# Patient Record
Sex: FEMALE | Race: WHITE | NOT HISPANIC OR LATINO | Employment: UNEMPLOYED | URBAN - METROPOLITAN AREA
[De-identification: names, ages, dates, MRNs, and addresses within clinical notes are randomized per-mention and may not be internally consistent; named-entity substitution may affect disease eponyms.]

---

## 2017-02-10 ENCOUNTER — TELEPHONE (OUTPATIENT)
Dept: FAMILY MEDICINE | Facility: CLINIC | Age: 52
End: 2017-02-10

## 2017-02-10 NOTE — TELEPHONE ENCOUNTER
"2/10/2017      Patient declined to schedule and might goes else where    Opal \"Kim\" Chance  Central Scheduler    "

## 2017-05-21 ENCOUNTER — HOSPITAL ENCOUNTER (EMERGENCY)
Facility: CLINIC | Age: 52
Discharge: HOME | End: 2017-05-21
Attending: EMERGENCY MEDICINE | Admitting: EMERGENCY MEDICINE
Payer: OTHER MISCELLANEOUS

## 2017-05-21 ENCOUNTER — APPOINTMENT (OUTPATIENT)
Dept: GENERAL RADIOLOGY | Facility: CLINIC | Age: 52
End: 2017-05-21
Attending: INTERNAL MEDICINE
Payer: OTHER MISCELLANEOUS

## 2017-05-21 VITALS
HEART RATE: 65 BPM | OXYGEN SATURATION: 99 % | TEMPERATURE: 97.9 F | RESPIRATION RATE: 14 BRPM | SYSTOLIC BLOOD PRESSURE: 113 MMHG | DIASTOLIC BLOOD PRESSURE: 73 MMHG

## 2017-05-21 DIAGNOSIS — S82.832A CLOSED FRACTURE OF DISTAL END OF LEFT FIBULA, UNSPECIFIED FRACTURE MORPHOLOGY, INITIAL ENCOUNTER: ICD-10-CM

## 2017-05-21 PROCEDURE — 29515 APPLICATION SHORT LEG SPLINT: CPT | Mod: LT

## 2017-05-21 PROCEDURE — 73610 X-RAY EXAM OF ANKLE: CPT | Mod: LT

## 2017-05-21 PROCEDURE — 25000132 ZZH RX MED GY IP 250 OP 250 PS 637: Performed by: EMERGENCY MEDICINE

## 2017-05-21 PROCEDURE — 99284 EMERGENCY DEPT VISIT MOD MDM: CPT | Mod: 25

## 2017-05-21 RX ORDER — IBUPROFEN 600 MG/1
600 TABLET, FILM COATED ORAL ONCE
Status: DISCONTINUED | OUTPATIENT
Start: 2017-05-21 | End: 2017-05-21 | Stop reason: HOSPADM

## 2017-05-21 RX ORDER — IBUPROFEN 800 MG/1
800 TABLET, FILM COATED ORAL ONCE
Status: COMPLETED | OUTPATIENT
Start: 2017-05-21 | End: 2017-05-21

## 2017-05-21 RX ORDER — OXYCODONE HYDROCHLORIDE 5 MG/1
5 TABLET ORAL EVERY 6 HOURS PRN
Qty: 10 TABLET | Refills: 0 | Status: SHIPPED | OUTPATIENT
Start: 2017-05-21 | End: 2020-08-11

## 2017-05-21 RX ORDER — IBUPROFEN 600 MG/1
600 TABLET, FILM COATED ORAL EVERY 6 HOURS PRN
Qty: 60 TABLET | Refills: 0 | Status: SHIPPED | OUTPATIENT
Start: 2017-05-21 | End: 2020-08-11

## 2017-05-21 RX ADMIN — IBUPROFEN 800 MG: 800 TABLET, FILM COATED ORAL at 16:25

## 2017-05-21 NOTE — ED PROVIDER NOTES
History     Chief Complaint:  Left Ankle Pain    HPI   Chani Marie is a 51 year old female who presents to the emergency department today for evaluation of left ankle pain. The patient reports that she tripped over a rug while at work at [employer] at 1300 and hurt her left ankle. The pain is around the outer aspect of the ankle. She denies pain elsewhere. She has not been able to walk on her left foot, and last ate/drank at 0700.    Allergies:  No Known Drug Allergies    Medications:    [place] thyroid    Past Medical History:    Unspecified hypothyroidsim    Past Surgical History:    Lasik surgery    Family History:    Cancer - father  Lipids - mother    Social History:  The patient was accompanied to the ED by  and daughter.  Smoking Status: Never smoker  Smokeless Tobacco: Never used  Alcohol Use: Yes  Marital Status:   [2]     Review of Systems   Musculoskeletal:        Left ankle pain   All other systems reviewed and are negative.    Physical Exam   Vitals:  Patient Vitals for the past 24 hrs:   BP Temp Temp src Pulse Resp SpO2   05/21/17 1442 113/73 97.9  F (36.6  C) Temporal 65 14 99 %     Physical Exam  General: Well-nourished, appears to be in pain  Eyes: PERRL, conjunctivae pink no scleral icterus or conjunctival injection  ENT:  Moist mucus membranes  Respiratory:  No respiratory distress  CV: Normal rate. Normal DP pulse and cap refill left foot  Skin: Warm, dry.  No rashes or petechiae  Musculoskeletal: Edema over lateral malleolus with ecchymosis and tenderness over the area.  No tenderness over mid foot, proximal fibula or remeainder of ankle/leg/foot.  Normal ROM at knee.  Neuro: Alert and oriented to person/place/time.  Normal distal sensation to LT.  Psychiatric: Normal affect      Emergency Department Course     Imaging:  Radiology findings were communicated with the patient who voiced understanding of the findings.    Ankle Xray, G/E 3 views, left  There is a  nondisplaced oblique fracture through the  distal fibula at the level of the ankle joint with overlying soft  tissue swelling. No other abnormality is seen.  Reading per radiology    Procedures:  1548   Splint Placement    PLACEMENT: Custom Orthoglass stirrup and posterior leg splint was applied to the left lower extremity and after placement I checked and adjusted the fit to ensure proper positioning. The patient was more comfortable with the splint in place. Sensation and circulation are intact after splint placement.     Interventions:  1516 ibuprofen 600 mg oral     Emergency Department Course:  Nursing notes and vitals reviewed.  I performed an exam of the patient as documented above.   The patient was sent for a ankle xray, G/E 3 views, left, while in the emergency department, results above.   At 1540 the patient was rechecked and was updated on the results of her imaging studies.   I discussed the treatment plan with the patient. She expressed understanding of this plan and consented to discharge. She will be discharged home with instructions for care and follow up. In addition, the patient will return to the emergency department if their symptoms persist, worsen, if new symptoms arise or if there is any concern.  All questions were answered.  I personally reviewed the imaging results with the Patient and answered all related questions prior to discharge.    Impression & Plan      Medical Decision Making:  Chani Marie is a 51 year old woman who twisted her ankle and fell. She has been non-ambulatory and has a distal fibular fracture.There is no evidence of a proximal fibular fracture, mid foot fracture.  On examination, she is neurovascularly intact. The fracture is non displaced. We put her in a stirrup and a post-mold splint with crutches, and she will be non weight bearing until she is seen by orthopedics. She was given splint precautions. She is to rest, ice, and elevate the leg and return if any  worsening occurs.    Diagnosis:    ICD-10-CM    1. Closed fracture of distal end of left fibula, unspecified fracture morphology, initial encounter S82.581P      Disposition:   Home    Discharge Medications:  New Prescriptions    IBUPROFEN (ADVIL/MOTRIN) 600 MG TABLET    Take 1 tablet (600 mg) by mouth every 6 hours as needed for moderate pain    OXYCODONE (ROXICODONE) 5 MG IR TABLET    Take 1 tablet (5 mg) by mouth every 6 hours as needed for pain       Scribe Disclosure:  Azalea CARLSON, am serving as a scribe at 3:05 PM on 5/21/2017 to document services personally performed by Mary Ellen Hale MD, based on my observations and the provider's statements to me.    5/21/2017   [facility] EMERGENCY DEPARTMENT       Mary Ellen Hale MD  05/21/17 3233

## 2017-05-21 NOTE — ED NOTES
Patient presents to the ED with left ankle pain. States tripped on rug and fell, twisting ankle. Unable to bear weight.

## 2017-05-21 NOTE — DISCHARGE INSTRUCTIONS
*Wear splint as directed.  Use crutches.  Rest, ice, elevation.  *Take medications as prescribed.  Ibuprofen and/or tylenol for pain and oxycodone for severe pain not relieved by ibuprofen. Continue your current medications.  *Follow-up with orthopedics in 3-5 days.  *Return if you become worse in any way.        Ankle Fracture, Distal Fibula  You have a fracture, or broken bone, of the end of the fibula bone. The fibula is one of two bones that support the ankle joint.    Home care    You will be given a splint, cast, or special boot to prevent movement at the injury site. Do not put weight on a splint. It will break. Follow your healthcare provider s advice about when to begin bearing weight on a cast or boot.    Keep your leg elevated when sitting or lying down. When sleeping, place a pillow under the injured leg. When sitting, support the injured leg so it is level with your waist. This is very important during the first 48 hours.    Keep the cast or splint completely dry at all times. When bathing, protect the cast or splint with 2 large plastic bags. Place 1 bag outside of the other. Tape each bag with duct tape at the top end. Water can still leak in even when the foot is covered. So it's best to keep the cast, splint, or boot away from water. If a fiberglass cast or splint gets wet, dry it with a hair dryer on a cool setting.    Place an ice pack over the injured area for no more than 15 to 20 minutes. Do this every 3 to 6 hours for the first 24 to 48 hours. Continue this 3 to 4 times a day as needed. To make an ice pack, put ice cubes in a plastic bag that seals at the top. Wrap the bag in a clean, thin towel or cloth. Never put ice or an ice pack directly on the skin. The ice pack can be put right on the cast or splint. As the ice melts, be careful that the cast or splint doesn t get wet.    You may use over-the-counter pain medicine to control pain, unless another pain medicine was prescribed. If you have  chronic liver or kidney disease or ever had a stomach ulcer or GI bleeding, talk with your provider before using these medicines.  Follow-up care  Follow up with your healthcare provider in 1 week, or as advised. This is to be sure the bone is healing properly. If you were given a splint, it may be changed to a cast after the swelling goes down.  If X-rays were taken, you will be told of any new findings that may affect your care.  When to seek medical advice  Call your healthcare provider right away if any of these occur:    The plaster cast or splint becomes wet or soft    The fiberglass cast or splint stays wet for more than 24 hours    There is increased tightness or pain under the cast or splint    Your toes become swollen, cold, blue, numb, or tingly    The cast becomes loose    The cast has a bad smell    Sore areas develop under the cast    The cast develops cracks or breaks     5108-6114 [employer]. [address] [place] [address]. All rights reserved. This information is not intended as a substitute for professional medical care. Always follow your healthcare professional's instructions.          Discharge Instructions  Splint Care    You had a splint put on today to help protect your injury and help it heal.  Splints are used to treat things like strains, sprains, cuts and fractures (broken bones).    Be sure your splint is not too tight!  If you splint is too tight, it may cause loss of blood supply.  Signs of your splint being too tight include:  your arm or leg hurting a lot more; your fingers or toes getting numb, cold, pale or blue; or your child is crying, fussing or seeming restless.    Return to the Emergency Department right away if:    You have increased pain or pressure around the injury.    You have numbness, tingling, or cool, pale, or blue toes or fingers past the injury.    Your child is more fussy than normal, crying a lot, or restless.    Your splint becomes soft,  breaks, or is wet.    Your splint begins to smell bad.    Your splint is cutting into your skin.    Home care:    Keep the injured area above the level of your heart while laying or sitting down.  This will help decrease the swelling and the pain.    Keep the splint dry.    Do not put objects down or inside the splint.    If there is an elastic bandage (Ace  wrap) holding the splint on this may be loosened slightly to relieve pressure or pain.  If pain continues return to the Emergency Department right away.    Do not remove your splint by yourself unless told to by your doctor.    Follow-up:  Sometimes the splint put on in the Emergency Department needs to be changed once the swelling has gone down and a more permanent cast needs to be placed.  This is usually done by a bone specialist doctor (Orthopedist).  Follow the instructions given to you by your doctor today.    X-rays:  X-rays done today were read by your doctor but will also be read by a radiologist.  We will contact you if the radiologist sees anything different on the x-ray.  Your regular doctor may also want to review your x-rays on follow-up.    You could have a fracture (break), even if we told you your x-rays were normal. X-rays are not always certain, and some fractures are hard to see and may not show up right away.  Also, your x-ray may look like you have a fracture, even though you do not.  It is important to follow-up with your regular doctor.     If you were given a prescription for medicine here today, be sure to read all of the information (including the package insert) that comes with your prescription.  This will include important information about the medicine, its side effects, and any warnings that you need to know about.  The pharmacist who fills the prescription can provide more information and answer questions you may have about the medicine.  If you have questions or concerns that the pharmacist cannot address, please call or return  to the Emergency Department.   Opioid Medication Information    Pain medications are among the most commonly prescribed medicines, so we are including this information for all our patients. If you did not receive pain medication or get a prescription for pain medicine, you can ignore it.     You may have been given a prescription for an opioid (narcotic) pain medicine and/or have received a pain medicine while here in the Emergency Department. These medicines can make you drowsy or impaired. You must not drive, operate dangerous equipment, or engage in any other dangerous activities while taking these medications. If you drive while taking these medications, you could be arrested for DUI, or driving under the influence. Do not drink any alcohol while you are taking these medications.     Opioid pain medications can cause addiction. If you have a history of chemical dependency of any type, you are at a higher risk of becoming addicted to pain medications.  Only take these prescribed medications to treat your pain when all other options have been tried. Take it for as short a time and as few doses as possible. Store your pain pills in a secure place, as they are frequently stolen and provide a dangerous opportunity for children or visitors in your house to start abusing these powerful medications. We will not replace any lost or stolen medicine.  As soon as your pain is better, you should flush all your remaining medication.     Many prescription pain medications contain Tylenol  (acetaminophen), including Vicodin , Tylenol #3 , Norco , Lortab , and Percocet .  You should not take any extra pills of Tylenol  if you are using these prescription medications or you can get very sick.  Do not ever take more than 3000 mg of acetaminophen in any 24 hour period.    All opioids tend to cause constipation. Drink plenty of water and eat foods that have a lot of fiber, such as fruits, vegetables, prune juice, apple juice and  high fiber cereal.  Take a laxative if you don t move your bowels at least every other day. Miralax , Milk of Magnesia, Colace , or Senna  can be used to keep you regular.      Remember that you can always come back to the Emergency Department if you are not able to see your regular doctor in the amount of time listed above, if you get any new symptoms, or if there is anything that worries you.

## 2017-05-21 NOTE — ED AVS SNAPSHOT
[facility] Emergency Department    [address]    [facility] [address]    Phone:  [phone]    Fax:  [phone]                                       Chani Marie   MRN: [id number]    Department:  [facility] Emergency Department   Date of Visit:  5/21/2017           After Visit Summary Signature Page     I have received my discharge instructions, and my questions have been answered. I have discussed any challenges I see with this plan with the nurse or doctor.    ..........................................................................................................................................  Patient/Patient Representative Signature      ..........................................................................................................................................  Patient Representative Print Name and Relationship to Patient    ..................................................               ................................................  Date                                            Time    ..........................................................................................................................................  Reviewed by Signature/Title    ...................................................              ..............................................  Date                                                            Time

## 2017-05-21 NOTE — ED AVS SNAPSHOT
[facility] Emergency Department    [address]    [facility] [address]    Phone:  [phone]    Fax:  [phone]                                       Chani Marie   MRN: [id number]    Department:  [facility] Emergency Department   Date of Visit:  5/21/2017           Patient Information     Date Of Birth          1965        Your diagnoses for this visit were:     Closed fracture of distal end of left fibula, unspecified fracture morphology, initial encounter        You were seen by Mary Ellen Hale MD.      Follow-up Information     Follow up with [facility] [place]. Schedule an appointment as soon as possible for a visit in 3 days.    Contact information:    [address]  [address]  [facility] [address] [phone]        Discharge Instructions       *Wear splint as directed.  Use crutches.  Rest, ice, elevation.  *Take medications as prescribed.  Ibuprofen and/or tylenol for pain and oxycodone for severe pain not relieved by ibuprofen. Continue your current medications.  *Follow-up with orthopedics in 3-5 days.  *Return if you become worse in any way.        Ankle Fracture, Distal Fibula  You have a fracture, or broken bone, of the end of the fibula bone. The fibula is one of two bones that support the ankle joint.    Home care    You will be given a splint, cast, or special boot to prevent movement at the injury site. Do not put weight on a splint. It will break. Follow your healthcare provider s advice about when to begin bearing weight on a cast or boot.    Keep your leg elevated when sitting or lying down. When sleeping, place a pillow under the injured leg. When sitting, support the injured leg so it is level with your waist. This is very important during the first 48 hours.    Keep the cast or splint completely dry at all times. When bathing, protect the cast or splint with 2 large plastic bags. Place 1 bag outside of the  other. Tape each bag with duct tape at the top end. Water can still leak in even when the foot is covered. So it's best to keep the cast, splint, or boot away from water. If a fiberglass cast or splint gets wet, dry it with a hair dryer on a cool setting.    Place an ice pack over the injured area for no more than 15 to 20 minutes. Do this every 3 to 6 hours for the first 24 to 48 hours. Continue this 3 to 4 times a day as needed. To make an ice pack, put ice cubes in a plastic bag that seals at the top. Wrap the bag in a clean, thin towel or cloth. Never put ice or an ice pack directly on the skin. The ice pack can be put right on the cast or splint. As the ice melts, be careful that the cast or splint doesn t get wet.    You may use over-the-counter pain medicine to control pain, unless another pain medicine was prescribed. If you have chronic liver or kidney disease or ever had a stomach ulcer or GI bleeding, talk with your provider before using these medicines.  Follow-up care  Follow up with your healthcare provider in 1 week, or as advised. This is to be sure the bone is healing properly. If you were given a splint, it may be changed to a cast after the swelling goes down.  If X-rays were taken, you will be told of any new findings that may affect your care.  When to seek medical advice  Call your healthcare provider right away if any of these occur:    The plaster cast or splint becomes wet or soft    The fiberglass cast or splint stays wet for more than 24 hours    There is increased tightness or pain under the cast or splint    Your toes become swollen, cold, blue, numb, or tingly    The cast becomes loose    The cast has a bad smell    Sore areas develop under the cast    The cast develops cracks or breaks     6252-4625 [employer] [address] [place] [address], [place] [address]. All rights reserved. This information is not intended as a substitute for professional medical care. Always follow your  healthcare professional's instructions.          Discharge Instructions  Splint Care    You had a splint put on today to help protect your injury and help it heal.  Splints are used to treat things like strains, sprains, cuts and fractures (broken bones).    Be sure your splint is not too tight!  If you splint is too tight, it may cause loss of blood supply.  Signs of your splint being too tight include:  your arm or leg hurting a lot more; your fingers or toes getting numb, cold, pale or blue; or your child is crying, fussing or seeming restless.    Return to the Emergency Department right away if:    You have increased pain or pressure around the injury.    You have numbness, tingling, or cool, pale, or blue toes or fingers past the injury.    Your child is more fussy than normal, crying a lot, or restless.    Your splint becomes soft, breaks, or is wet.    Your splint begins to smell bad.    Your splint is cutting into your skin.    Home care:    Keep the injured area above the level of your heart while laying or sitting down.  This will help decrease the swelling and the pain.    Keep the splint dry.    Do not put objects down or inside the splint.    If there is an elastic bandage (Ace  wrap) holding the splint on this may be loosened slightly to relieve pressure or pain.  If pain continues return to the Emergency Department right away.    Do not remove your splint by yourself unless told to by your doctor.    Follow-up:  Sometimes the splint put on in the Emergency Department needs to be changed once the swelling has gone down and a more permanent cast needs to be placed.  This is usually done by a bone specialist doctor (Orthopedist).  Follow the instructions given to you by your doctor today.    X-rays:  X-rays done today were read by your doctor but will also be read by a radiologist.  We will contact you if the radiologist sees anything different on the x-ray.  Your regular doctor may also want to review  your x-rays on follow-up.    You could have a fracture (break), even if we told you your x-rays were normal. X-rays are not always certain, and some fractures are hard to see and may not show up right away.  Also, your x-ray may look like you have a fracture, even though you do not.  It is important to follow-up with your regular doctor.     If you were given a prescription for medicine here today, be sure to read all of the information (including the package insert) that comes with your prescription.  This will include important information about the medicine, its side effects, and any warnings that you need to know about.  The pharmacist who fills the prescription can provide more information and answer questions you may have about the medicine.  If you have questions or concerns that the pharmacist cannot address, please call or return to the Emergency Department.   Opioid Medication Information    Pain medications are among the most commonly prescribed medicines, so we are including this information for all our patients. If you did not receive pain medication or get a prescription for pain medicine, you can ignore it.     You may have been given a prescription for an opioid (narcotic) pain medicine and/or have received a pain medicine while here in the Emergency Department. These medicines can make you drowsy or impaired. You must not drive, operate dangerous equipment, or engage in any other dangerous activities while taking these medications. If you drive while taking these medications, you could be arrested for DUI, or driving under the influence. Do not drink any alcohol while you are taking these medications.     Opioid pain medications can cause addiction. If you have a history of chemical dependency of any type, you are at a higher risk of becoming addicted to pain medications.  Only take these prescribed medications to treat your pain when all other options have been tried. Take it for as short a time  and as few doses as possible. Store your pain pills in a secure place, as they are frequently stolen and provide a dangerous opportunity for children or visitors in your house to start abusing these powerful medications. We will not replace any lost or stolen medicine.  As soon as your pain is better, you should flush all your remaining medication.     Many prescription pain medications contain Tylenol  (acetaminophen), including Vicodin , Tylenol #3 , Norco , Lortab , and Percocet .  You should not take any extra pills of Tylenol  if you are using these prescription medications or you can get very sick.  Do not ever take more than 3000 mg of acetaminophen in any 24 hour period.    All opioids tend to cause constipation. Drink plenty of water and eat foods that have a lot of fiber, such as fruits, vegetables, prune juice, apple juice and high fiber cereal.  Take a laxative if you don t move your bowels at least every other day. Miralax , Milk of Magnesia, Colace , or Senna  can be used to keep you regular.      Remember that you can always come back to the Emergency Department if you are not able to see your regular doctor in the amount of time listed above, if you get any new symptoms, or if there is anything that worries you.      24 Hour Appointment Hotline       To make an appointment at any [facility] clinic, call [phone] ([phone]). If you don't have a family doctor or clinic, we will help you find one. [facility] clinics are conveniently located to serve the needs of you and your family.             Review of your medicines      START taking        Dose / Directions Last dose taken    ibuprofen 600 MG tablet   Commonly known as:  ADVIL/MOTRIN   Dose:  600 mg   Quantity:  60 tablet        Take 1 tablet (600 mg) by mouth every 6 hours as needed for moderate pain   Refills:  0        oxyCODONE 5 MG IR tablet   Commonly known as:  ROXICODONE   Dose:  5 mg   Quantity:  10 tablet        Take 1 tablet (5  mg) by mouth every 6 hours as needed for pain   Refills:  0          Our records show that you are taking the medicines listed below. If these are incorrect, please call your family doctor or clinic.        Dose / Directions Last dose taken    * FLAKO THYROID 90 MG Tabs   Quantity:  90 tablet   Generic drug:  Thyroid        Take one tab by mouth daily except 2 days of the week; will take 60 mg those days.   Refills:  0        * FLAKO THYROID 60 MG tablet   Quantity:  25 tablet   Generic drug:  thyroid        Take one tab by mouth once daily two days of the week; taking 90 mg on the other days.   Refills:  0        * Notice:  This list has 2 medication(s) that are the same as other medications prescribed for you. Read the directions carefully, and ask your doctor or other care provider to review them with you.            Prescriptions were sent or printed at these locations (2 Prescriptions)                   Other Prescriptions                Printed at Department/Unit printer (2 of 2)         ibuprofen (ADVIL/MOTRIN) 600 MG tablet               oxyCODONE (ROXICODONE) 5 MG IR tablet                Procedures and tests performed during your visit     Ankle XR, G/E 3 views, left      Orders Needing Specimen Collection     None      Pending Results     No orders found from 5/19/2017 to 5/22/2017.            Pending Culture Results     No orders found from 5/19/2017 to 5/22/2017.            Pending Results Instructions     If you had any lab results that were not finalized at the time of your Discharge, you can call the ED Lab Result RN at [phone]. You will be contacted by this team for any positive Lab results or changes in treatment. The nurses are available 7 days a week from 10A to 6:30P.  You can leave a message 24 hours per day and they will return your call.        Test Results From Your Hospital Stay        5/21/2017  3:37 PM      Narrative     LEFT ANKLE THREE VIEWS   5/21/2017 3:27 PM    HISTORY: Pain  after falling.    COMPARISON: None.        Impression     IMPRESSION: There is a nondisplaced oblique fracture through the  distal fibula at the level of the ankle joint with overlying soft  tissue swelling. No other abnormality is seen.     MAJOR MOORE MD                Clinical Quality Measure: Blood Pressure Screening     Your blood pressure was checked while you were in the emergency department today. The last reading we obtained was  BP: 113/73 . Please read the guidelines below about what these numbers mean and what you should do about them.  If your systolic blood pressure (the top number) is less than 120 and your diastolic blood pressure (the bottom number) is less than 80, then your blood pressure is normal. There is nothing more that you need to do about it.  If your systolic blood pressure (the top number) is 120-139 or your diastolic blood pressure (the bottom number) is 80-89, your blood pressure may be higher than it should be. You should have your blood pressure rechecked within a year by a primary care provider.  If your systolic blood pressure (the top number) is 140 or greater or your diastolic blood pressure (the bottom number) is 90 or greater, you may have high blood pressure. High blood pressure is treatable, but if left untreated over time it can put you at risk for heart attack, stroke, or kidney failure. You should have your blood pressure rechecked by a primary care provider within the next 4 weeks.  If your provider in the emergency department today gave you specific instructions to follow-up with your doctor or provider even sooner than that, you should follow that instruction and not wait for up to 4 weeks for your follow-up visit.        Thank you for choosing [facility]       Thank you for choosing [facility] for your care. Our goal is always to provide you with excellent care. Hearing back from our patients is one way we can continue to improve our services. Please take a few  minutes to complete the written survey that you may receive in the mail after you visit with us. Thank you!        [facility] Information     [employer] gives you secure access to your electronic health record. If you see a primary care provider, you can also send messages to your care team and make appointments. If you have questions, please call your primary care clinic.  If you do not have a primary care provider, please call [phone] and they will assist you.        Care EveryWhere ID     This is your Care EveryWhere ID. This could be used by other organizations to access your [facility] medical records  [id number]        After Visit Summary       This is your record. Keep this with you and show to your community pharmacist(s) and doctor(s) at your next visit.

## 2017-05-30 ENCOUNTER — TRANSFERRED RECORDS (OUTPATIENT)
Dept: HEALTH INFORMATION MANAGEMENT | Facility: CLINIC | Age: 52
End: 2017-05-30

## 2017-05-30 DIAGNOSIS — E03.9 HYPOTHYROIDISM, UNSPECIFIED TYPE: ICD-10-CM

## 2017-05-30 RX ORDER — THYROID,PORK 90 MG
TABLET ORAL
Qty: 60 TABLET | Refills: 1 | Status: SHIPPED | OUTPATIENT
Start: 2017-05-30 | End: 2017-10-08

## 2017-05-30 NOTE — TELEPHONE ENCOUNTER
FLAKO THYROID 90 MG     Last Written Prescription Date: 1/23/16  Last Quantity: 90, # refills: 0  Last Office Visit with G, P or [facility] prescribing provider: 11/22/16        TSH   Date Value Ref Range Status   11/22/2016 2.41 0.40 - 4.00 mU/L Final

## 2017-06-06 ENCOUNTER — TRANSFERRED RECORDS (OUTPATIENT)
Dept: HEALTH INFORMATION MANAGEMENT | Facility: CLINIC | Age: 52
End: 2017-06-06

## 2017-06-13 ENCOUNTER — TRANSFERRED RECORDS (OUTPATIENT)
Dept: HEALTH INFORMATION MANAGEMENT | Facility: CLINIC | Age: 52
End: 2017-06-13

## 2017-06-26 DIAGNOSIS — E03.9 HYPOTHYROIDISM, UNSPECIFIED TYPE: ICD-10-CM

## 2017-06-27 NOTE — TELEPHONE ENCOUNTER
FLAKO THYROID 60 MG tablet     Last Written Prescription Date: 11/23/16  Last Quantity: 25, # refills: 0  Last Office Visit with G, P or [facility] prescribing provider: 11/22/16        TSH   Date Value Ref Range Status   11/22/2016 2.41 0.40 - 4.00 mU/L Final

## 2017-06-28 RX ORDER — THYROID 60 MG
TABLET ORAL
Qty: 24 TABLET | Refills: 1 | Status: SHIPPED | OUTPATIENT
Start: 2017-06-28 | End: 2018-01-06

## 2017-06-28 NOTE — TELEPHONE ENCOUNTER
Prescription approved per [facility] [place] [place] Refill Protocol.  Alternates with 90 mcg dose. Next labs November.  Paulina Herzog, RN  Triage Nurse

## 2017-07-10 ENCOUNTER — TRANSFERRED RECORDS (OUTPATIENT)
Dept: HEALTH INFORMATION MANAGEMENT | Facility: CLINIC | Age: 52
End: 2017-07-10

## 2017-07-31 ENCOUNTER — TRANSFERRED RECORDS (OUTPATIENT)
Dept: HEALTH INFORMATION MANAGEMENT | Facility: CLINIC | Age: 52
End: 2017-07-31

## 2017-08-21 ENCOUNTER — TRANSFERRED RECORDS (OUTPATIENT)
Dept: HEALTH INFORMATION MANAGEMENT | Facility: CLINIC | Age: 52
End: 2017-08-21

## 2017-10-08 DIAGNOSIS — E03.9 HYPOTHYROIDISM, UNSPECIFIED TYPE: ICD-10-CM

## 2017-10-09 RX ORDER — THYROID,PORK 90 MG
TABLET ORAL
Qty: 20 TABLET | Refills: 1 | Status: SHIPPED | OUTPATIENT
Start: 2017-10-09 | End: 2017-12-16

## 2017-10-09 NOTE — TELEPHONE ENCOUNTER
FLAKO THYROID 90 MG     Last Written Prescription Date: 5/30/17  Last Quantity: 60, # refills: 1  Last Office Visit with G, P or [facility] prescribing provider: 11/22/16        TSH   Date Value Ref Range Status   11/22/2016 2.41 0.40 - 4.00 mU/L Final

## 2018-01-05 ENCOUNTER — OFFICE VISIT (OUTPATIENT)
Dept: FAMILY MEDICINE | Facility: CLINIC | Age: 53
End: 2018-01-05
Payer: COMMERCIAL

## 2018-01-05 VITALS
DIASTOLIC BLOOD PRESSURE: 58 MMHG | SYSTOLIC BLOOD PRESSURE: 102 MMHG | OXYGEN SATURATION: 99 % | BODY MASS INDEX: 23.56 KG/M2 | TEMPERATURE: 98 F | HEIGHT: 61 IN | WEIGHT: 124.8 LBS | HEART RATE: 61 BPM | RESPIRATION RATE: 16 BRPM

## 2018-01-05 DIAGNOSIS — Z12.11 SPECIAL SCREENING FOR MALIGNANT NEOPLASMS, COLON: ICD-10-CM

## 2018-01-05 DIAGNOSIS — E03.9 HYPOTHYROIDISM, UNSPECIFIED TYPE: Primary | ICD-10-CM

## 2018-01-05 PROCEDURE — 36415 COLL VENOUS BLD VENIPUNCTURE: CPT | Performed by: FAMILY MEDICINE

## 2018-01-05 PROCEDURE — 84443 ASSAY THYROID STIM HORMONE: CPT | Performed by: FAMILY MEDICINE

## 2018-01-05 PROCEDURE — 99213 OFFICE O/P EST LOW 20 MIN: CPT | Performed by: FAMILY MEDICINE

## 2018-01-05 NOTE — MR AVS SNAPSHOT
After Visit Summary   1/5/2018    Chani Marie    MRN: [id number]           Patient Information     Date Of Birth          1965        Visit Information        Provider Department      1/5/2018 10:10 AM Deyanira Darling MD [facility]        Today's Diagnoses     Hypothyroidism, unspecified type    -  1    Special screening for malignant neoplasms, colon           Follow-ups after your visit        Future tests that were ordered for you today     Open Future Orders        Priority Expected Expires Ordered    Fecal colorectal cancer screen (FIT) Routine 1/26/2018 3/30/2018 1/5/2018            Who to contact     If you have questions or need follow up information about today's clinic visit or your schedule please contact [facility] directly at [phone].  Normal or non-critical lab and imaging results will be communicated to you by [employer], letter or phone within 4 business days after the clinic has received the results. If you do not hear from us within 7 days, please contact the clinic through Addyhart or phone. If you have a critical or abnormal lab result, we will notify you by phone as soon as possible.  Submit refill requests through [employer] or call your pharmacy and they will forward the refill request to us. Please allow 3 business days for your refill to be completed.          Additional Information About Your Visit        MyChart Information     [employer] gives you secure access to your electronic health record. If you see a primary care provider, you can also send messages to your care team and make appointments. If you have questions, please call your primary care clinic.  If you do not have a primary care provider, please call [phone] and they will assist you.        Care EveryWhere ID     This is your Care EveryWhere ID. This could be used by other organizations to access your [facility] medical records  [id number]        Your Vitals Were     Pulse  "Temperature Respirations Height Last Period Pulse Oximetry    61 98  F (36.7  C) (Oral) 16 5' 1\" (1.549 m) 09/01/2008 99%    BMI (Body Mass Index)                   23.58 kg/m2            Blood Pressure from Last 3 Encounters:   01/05/18 102/58   05/21/17 113/73   11/22/16 96/62    Weight from Last 3 Encounters:   01/05/18 124 lb 12.8 oz (56.6 kg)   11/22/16 123 lb 4.8 oz (55.9 kg)   10/13/15 120 lb (54.4 kg)              We Performed the Following     TSH with free T4 reflex        Primary Care Provider Office Phone # Fax #    Deyanira Darling [phone] [address] [facility] [address]        Equal Access to Services     [facility] Hospital[facility] : Hadii aad ku hadasho Soomaali, waaxda luqadaha, qaybta kaalmada adeegyada, rivera rivas . So [facility] [phone].    ATENCIÓN: Si habla español, tiene a cotter disposición servicios gratuitos de asistencia lingüística. Nora al [phone].    We comply with applicable federal civil rights laws and [place] laws. We do not discriminate on the basis of race, color, national origin, age, disability, sex, sexual orientation, or gender identity.            Thank you!     Thank you for choosing [facility]  for your care. Our goal is always to provide you with excellent care. Hearing back from our patients is one way we can continue to improve our services. Please take a few minutes to complete the written survey that you may receive in the mail after your visit with us. Thank you!             Your Updated Medication List - Protect others around you: Learn how to safely use, store and throw away your medicines at www.disposemymeds.org.          This list is accurate as of: 1/5/18 11:02 AM.  Always use your most recent med list.                   Brand Name Dispense Instructions for use Diagnosis    * ARMOUR THYROID 60 MG tablet   Generic drug:  thyroid     24 tablet    TAKE 1 TAB 2 DAYS OF THE WEEK,AND 90MG DAILY FOR 5 " DAYS OF THE WEEK    Hypothyroidism, unspecified type       * FLAKO THYROID 90 MG Tabs   Generic drug:  Thyroid     26 tablet    TAKE 1 TAB ONCE DAILY 5 DAYS OF THE WEEK, TAKE 60 MG THE OTHER 2 DAYS OF THE WEEK.NEEDS APPOINTMENT    Hypothyroidism, unspecified type       ibuprofen 600 MG tablet    ADVIL/MOTRIN    60 tablet    Take 1 tablet (600 mg) by mouth every 6 hours as needed for moderate pain        oxyCODONE IR 5 MG tablet    ROXICODONE    10 tablet    Take 1 tablet (5 mg) by mouth every 6 hours as needed for pain        * Notice:  This list has 2 medication(s) that are the same as other medications prescribed for you. Read the directions carefully, and ask your doctor or other care provider to review them with you.

## 2018-01-05 NOTE — PROGRESS NOTES
"  SUBJECTIVE:   Chani Marie is a 52 year old female who presents to clinic today for the following health issues:      Hypothyroidism Follow-up      Since last visit, patient describes the following symptoms: Weight stable, no hair loss, no skin changes, no constipation, no loose stools      Amount of exercise or physical activity: walks or runs 4-5 times per week    Problems taking medications regularly: No    Medication side effects: none    Diet: regular (no restrictions)              Problem list and histories reviewed & adjusted, as indicated.  Additional history:     See under ROS     Patient Active Problem List   Diagnosis     Hypothyroidism     CARDIOVASCULAR SCREENING; LDL GOAL LESS THAN 160       Current Outpatient Prescriptions   Medication Sig Dispense Refill     ARMOUR THYROID 90 MG TABS TAKE 1 TAB ONCE DAILY 5 DAYS OF THE WEEK, TAKE 60 MG THE OTHER 2 DAYS OF THE WEEK.NEEDS APPOINTMENT 26 tablet 0     ARMOUR THYROID 60 MG tablet TAKE 1 TAB 2 DAYS OF THE WEEK,AND 90MG DAILY FOR 5 DAYS OF THE WEEK 24 tablet 1     ibuprofen (ADVIL/MOTRIN) 600 MG tablet Take 1 tablet (600 mg) by mouth every 6 hours as needed for moderate pain (Patient not taking: Reported on 1/5/2018) 60 tablet 0     oxyCODONE (ROXICODONE) 5 MG IR tablet Take 1 tablet (5 mg) by mouth every 6 hours as needed for pain (Patient not taking: Reported on 1/5/2018) 10 tablet 0         Reviewed and updated as needed this visit by clinical staffTobacco  Allergies  Med Hx  Surg Hx  Fam Hx  Soc Hx      Reviewed and updated as needed this visit by Provider         ROS:  CONSTITUTIONAL:NEGATIVE for fever, chills, change in weight  CV: NEGATIVE for chest pain, palpitations or peripheral edema  MUSCULOSKELETAL: no swelling  PSYCHIATRIC: NEGATIVE for changes in mood or affect    Energy level has been good.     OBJECTIVE:     /58 (BP Location: Right arm, Cuff Size: Adult Regular)  Pulse 61  Temp 98  F (36.7  C) (Oral)  Resp 16  Ht 5' 1\" " (1.549 m)  Wt 124 lb 12.8 oz (56.6 kg)  LMP 09/01/2008  SpO2 99%  BMI 23.58 kg/m2  Body mass index is 23.58 kg/(m^2).  GENERAL APPEARANCE: alert and no distress  NECK: no adenopathy and thyroid normal to palpation  RESP: lungs clear to auscultation - no rales, rhonchi or wheezes  CV: regular rates and rhythm  NEURO: Normal strength and tone, mentation intact and speech normal. Reflexes symmetric.  PSYCH: mentation appears normal and affect normal/bright    TSH   Date Value Ref Range Status   11/22/2016 2.41 0.40 - 4.00 mU/L Final   ]          ASSESSMENT/PLAN:     Hypothyroidism, unspecified type  Clinically euthyroid.   - TSH with free T4 reflex  - Thyroid (ARMOUR THYROID) 90 MG TABS; TAKE 1 TAB ONCE DAILY 5 DAYS OF THE WEEK, TAKE 60 MG THE OTHER 2 DAYS OF THE WEEK.NEEDS APPOINTMENT  - thyroid (ARMOUR THYROID) 60 MG tablet; TAKE 1 TAB 2 DAYS OF THE WEEK,AND 90MG DAILY FOR 5 DAYS OF THE WEEK    Special screening for malignant neoplasms, colon  She does not want to do colonoscopy. Will consider FIT.  She defers mammogram, flu shot.   - Fecal colorectal cancer screen (FIT); Future    Follow up in a year.    Deyanira Darling MD, MD  [facility]

## 2018-01-05 NOTE — NURSING NOTE
"Chief Complaint   Patient presents with     Thyroid Problem     medication recheck       Initial /58 (BP Location: Right arm, Cuff Size: Adult Regular)  Pulse 61  Temp 98  F (36.7  C) (Oral)  Resp 16  Ht 5' 1\" (1.549 m)  Wt 124 lb 12.8 oz (56.6 kg)  LMP 09/01/2008  SpO2 99%  BMI 23.58 kg/m2 Estimated body mass index is 23.58 kg/(m^2) as calculated from the following:    Height as of this encounter: 5' 1\" (1.549 m).    Weight as of this encounter: 124 lb 12.8 oz (56.6 kg).  Medication Reconciliation: complete   Karley Greene CMA    Declines the colon/fit test  Will have the OB order the mammogram  Karley Greene CMA    "

## 2018-01-06 LAB — TSH SERPL DL<=0.005 MIU/L-ACNC: 0.48 MU/L (ref 0.4–4)

## 2018-01-06 RX ORDER — THYROID 90 MG/1
TABLET ORAL
Qty: 65 TABLET | Refills: 3 | Status: SHIPPED | OUTPATIENT
Start: 2018-01-06 | End: 2019-01-07

## 2018-01-06 RX ORDER — THYROID 60 MG/1
TABLET ORAL
Qty: 26 TABLET | Refills: 3 | Status: SHIPPED | OUTPATIENT
Start: 2018-01-06 | End: 2019-01-09

## 2018-01-13 DIAGNOSIS — E03.9 HYPOTHYROIDISM, UNSPECIFIED TYPE: ICD-10-CM

## 2018-01-13 NOTE — TELEPHONE ENCOUNTER
Requested Prescriptions   Pending Prescriptions Disp Refills     FLAKO THYROID 60 MG tablet [Pharmacy Med Name: ARMOUR THYROID 60  Last Written Prescription Date:  01/06/2018  Last Fill Quantity: 26 tablet,  # refills: 3   Last Office Visit with [facility], [facility] or [facility] prescribing provider:  01/05/2018   Future Office Visit:      MG TABLET] 8 tablet 5     Sig: TAKE 1 TABLET BY MOUTH 2 DAYS A WEEK & 90MGS FOR 5 DAYS A WEEK    There is no refill protocol information for this order

## 2018-01-17 RX ORDER — THYROID 60 MG
TABLET ORAL
Qty: 8 TABLET | Refills: 5 | OUTPATIENT
Start: 2018-01-17

## 2018-01-17 NOTE — TELEPHONE ENCOUNTER
Prescription already sent 1/6/18.  Pharmacy notified of duplicate request.    Jessica Gallegos RN

## 2018-01-19 ENCOUNTER — TELEPHONE (OUTPATIENT)
Dept: FAMILY MEDICINE | Facility: CLINIC | Age: 53
End: 2018-01-19

## 2018-01-19 NOTE — TELEPHONE ENCOUNTER
1/19/2018    Attempt 1    Contacted patient in regards to scheduling VIP mammogram  Message HUNG UP    Patient is also due for - Preventive Health Screening Colonoscopy    Comments:       Outreach   Yasmeen Hubbard

## 2018-07-25 NOTE — TELEPHONE ENCOUNTER
Per outreach, patient is aware of overdue screening and will call on their own time for scheduling.     Thanks      Outreach ,  Sheron Triplett

## 2018-10-08 ENCOUNTER — TRANSFERRED RECORDS (OUTPATIENT)
Dept: HEALTH INFORMATION MANAGEMENT | Facility: CLINIC | Age: 53
End: 2018-10-08

## 2018-10-08 LAB
HPV ABSTRACT: NORMAL
PAP SMEAR - HIM PATIENT REPORTED: NEGATIVE

## 2019-01-07 DIAGNOSIS — Z13.6 CARDIOVASCULAR SCREENING; LDL GOAL LESS THAN 160: Primary | ICD-10-CM

## 2019-01-07 DIAGNOSIS — E03.9 HYPOTHYROIDISM, UNSPECIFIED TYPE: ICD-10-CM

## 2019-01-07 NOTE — TELEPHONE ENCOUNTER
"Routing refill request to provider for review/approval because:  Labs not current:  THS    Last Written Prescription Date:  1/6/18  Last Fill Quantity: 65,  # refills: 3   Last office visit: 1/5/2018 with prescribing provider:  MITCH Darling   Future Office Visit:   Next 5 appointments (look out 90 days)    Feb 08, 2019  8:50 AM CST  Office Visit with Deyanira Darling MD  [address] [address] [phone]         Requested Prescriptions   Pending Prescriptions Disp Refills     thyroid (ARMOUR THYROID) 90 MG tablet 65 tablet 3     Sig: TAKE 1 TAB ONCE DAILY 5 DAYS OF THE WEEK, TAKE 60 MG THE OTHER 2 DAYS OF THE WEEK.NEEDS APPOINTMENT    Thyroid Protocol Failed - 1/7/2019 12:41 PM       Failed - Recent (12 mo) or future (30 days) visit within the authorizing provider's specialty    Patient had office visit in the last 12 months or has a visit in the next 30 days with authorizing provider or within the authorizing provider's specialty.  See \"Patient Info\" tab in inbasket, or \"Choose Columns\" in Meds & Orders section of the refill encounter.             Failed - Normal TSH on file in past 12 months    Recent Labs   Lab Test 01/05/18  1104   TSH 0.48             Passed - Patient is 12 years or older       Passed - Medication is active on med list       Passed - No active pregnancy on record    If patient is pregnant or has had a positive pregnancy test, please check TSH.         Passed - No positive pregnancy test in past 12 months    If patient is pregnant or has had a positive pregnancy test, please check TSH.            Yana HERNANDEZ Triage RN    "

## 2019-01-07 NOTE — TELEPHONE ENCOUNTER
Reason for call:  Per patient: Is there anyway I can get my Thyroid (ARMOUR THYROID) 90 MG TABS refilled before my appt on 2/8/19. I'll be out of the medication before then    Phone number to reach patient:  Cell number on file:    Telephone Information:   Mobile [phone]       Best Time:  Anytime    Can we leave a detailed message on this number?  YES

## 2019-01-07 NOTE — TELEPHONE ENCOUNTER
She needs labs and appointment. She has an appointment on 2/8; does she want a refill through then?   (do not want her to run out before lab....)    If so, pharmacy?   ( I did put future order in for TSH reflex; if she is coming for lab in advance, are there others she would like?)    Thanks!

## 2019-01-09 RX ORDER — THYROID 60 MG/1
TABLET ORAL
Qty: 8 TABLET | Refills: 0 | Status: SHIPPED | OUTPATIENT
Start: 2019-01-09 | End: 2019-02-11 | Stop reason: DRUGHIGH

## 2019-01-09 RX ORDER — THYROID 90 MG/1
TABLET ORAL
Qty: 20 TABLET | Refills: 0 | Status: SHIPPED | OUTPATIENT
Start: 2019-01-09 | End: 2019-02-11

## 2019-01-09 NOTE — TELEPHONE ENCOUNTER
Patient is doing biometric screening and requesting fasting labs. Placed lab order for lipid panel and BMP. Has early am appt with pcp and will do then.    Has not missed doses of thyroid med.    Requests 1 month refill for both doses of Thyroid.    Refill sent to pharmacy.    Laine Hawley RN

## 2019-02-08 ENCOUNTER — DOCUMENTATION ONLY (OUTPATIENT)
Dept: FAMILY MEDICINE | Facility: CLINIC | Age: 54
End: 2019-02-08

## 2019-02-08 ENCOUNTER — OFFICE VISIT (OUTPATIENT)
Dept: FAMILY MEDICINE | Facility: CLINIC | Age: 54
End: 2019-02-08
Payer: COMMERCIAL

## 2019-02-08 VITALS
RESPIRATION RATE: 12 BRPM | BODY MASS INDEX: 23.88 KG/M2 | HEART RATE: 53 BPM | TEMPERATURE: 97.8 F | HEIGHT: 61 IN | OXYGEN SATURATION: 98 % | SYSTOLIC BLOOD PRESSURE: 114 MMHG | WEIGHT: 126.5 LBS | DIASTOLIC BLOOD PRESSURE: 70 MMHG

## 2019-02-08 DIAGNOSIS — E03.9 HYPOTHYROIDISM, UNSPECIFIED TYPE: Primary | ICD-10-CM

## 2019-02-08 DIAGNOSIS — Z13.6 CARDIOVASCULAR SCREENING; LDL GOAL LESS THAN 160: ICD-10-CM

## 2019-02-08 DIAGNOSIS — Z13.1 SCREENING FOR DIABETES MELLITUS: ICD-10-CM

## 2019-02-08 LAB
CHOLEST SERPL-MCNC: 259 MG/DL
GLUCOSE SERPL-MCNC: 87 MG/DL (ref 70–99)
HDLC SERPL-MCNC: 77 MG/DL
LDLC SERPL CALC-MCNC: 166 MG/DL
NONHDLC SERPL-MCNC: 182 MG/DL
T4 FREE SERPL-MCNC: 0.72 NG/DL (ref 0.76–1.46)
TRIGL SERPL-MCNC: 81 MG/DL
TSH SERPL DL<=0.005 MIU/L-ACNC: 13.69 MU/L (ref 0.4–4)

## 2019-02-08 PROCEDURE — 36415 COLL VENOUS BLD VENIPUNCTURE: CPT | Performed by: FAMILY MEDICINE

## 2019-02-08 PROCEDURE — 84439 ASSAY OF FREE THYROXINE: CPT | Performed by: FAMILY MEDICINE

## 2019-02-08 PROCEDURE — 80061 LIPID PANEL: CPT | Performed by: FAMILY MEDICINE

## 2019-02-08 PROCEDURE — 84443 ASSAY THYROID STIM HORMONE: CPT | Performed by: FAMILY MEDICINE

## 2019-02-08 PROCEDURE — 82947 ASSAY GLUCOSE BLOOD QUANT: CPT | Performed by: FAMILY MEDICINE

## 2019-02-08 PROCEDURE — 99213 OFFICE O/P EST LOW 20 MIN: CPT | Performed by: FAMILY MEDICINE

## 2019-02-08 RX ORDER — THYROID 60 MG/1
TABLET ORAL
Qty: 8 TABLET | Refills: 0 | Status: CANCELLED | OUTPATIENT
Start: 2019-02-08

## 2019-02-08 RX ORDER — THYROID 90 MG/1
TABLET ORAL
Qty: 20 TABLET | Refills: 0 | Status: CANCELLED | OUTPATIENT
Start: 2019-02-08

## 2019-02-08 ASSESSMENT — ENCOUNTER SYMPTOMS
FREQUENCY: 0
NERVOUS/ANXIOUS: 0
COUGH: 0
BREAST MASS: 0
SORE THROAT: 0
DIARRHEA: 0
CHILLS: 0
PALPITATIONS: 0
ARTHRALGIAS: 0
ABDOMINAL PAIN: 0
NAUSEA: 0
HEARTBURN: 0
FEVER: 0
CONSTIPATION: 0
DYSURIA: 0
HEADACHES: 0
PARESTHESIAS: 0
EYE PAIN: 0
HEMATOCHEZIA: 0
SHORTNESS OF BREATH: 0
WEAKNESS: 0
DIZZINESS: 0
MYALGIAS: 0

## 2019-02-08 ASSESSMENT — MIFFLIN-ST. JEOR: SCORE: 1130.14

## 2019-02-08 NOTE — PROGRESS NOTES
SUBJECTIVE:   CC: Chani Marie is an 53 year old woman who presents for preventive health visit.     Patient is fasting: Yes   Concerns:  1. Thyroid Check  2. Biometric testing for her 's work.    She was anticipating a med check, not a PE.   She does [facility] to [facility]ud[facility]; notes she was just recently there and had a pap and breast exam.     Physical   Annual:     Getting at least 3 servings of Calcium per day:  Yes    Bi-annual eye exam:  Yes    Dental care twice a year:  Yes    Sleep apnea or symptoms of sleep apnea:  None    Diet:  Regular (no restrictions)    Frequency of exercise:  4-5 days/week    Duration of exercise:  30-45 minutes    Taking medications regularly:  No    Barriers to taking medications:  None    Additional concerns today:  YES    PHQ-2 Total Score: 0      Today's PHQ-2 Score:   PHQ-2 ( 1999 Pf[employer]) 2/8/2019   Q1: Little interest or pleasure in doing things 0   Q2: Feeling down, depressed or hopeless 0   PHQ-2 Score 0   Q1: Little interest or pleasure in doing things Not at all   Q2: Feeling down, depressed or hopeless Not at all   PHQ-2 Score 0     Abuse: Current or Past(Physical, Sexual or Emotional)- No  Do you feel safe in your environment? Yes    Social History     Tobacco Use     Smoking status: Never Smoker     Smokeless tobacco: Never Used   Substance Use Topics     Alcohol use: Yes     Alcohol/week: 0.0 - 0.5 oz     Alcohol Use 2/8/2019   If you drink alcohol do you typically have greater than 3 drinks per day OR greater than 7 drinks per week? No       Reviewed orders with patient.  Reviewed health maintenance and updated orders accordingly - Yes          Pertinent mammograms are reviewed under the imaging tab.  History of abnormal Pap smear:   PAP / HPV Latest Ref Rng & Units 6/1/2016 6/1/2005   PAP Negative Negative NIL     Reviewed and updated as needed this visit by clinical staff         Reviewed and updated as needed this visit by Provider        See under  "ROS    Patient Active Problem List   Diagnosis     Hypothyroidism     CARDIOVASCULAR SCREENING; LDL GOAL LESS THAN 160       Current Outpatient Medications   Medication Sig Dispense Refill     thyroid (ARMOUR THYROID) 60 MG tablet TAKE 1 TAB 2 DAYS OF THE WEEK,AND 90MG DAILY FOR 5 DAYS OF THE WEEK 8 tablet 0     thyroid (ARMOUR THYROID) 90 MG tablet TAKE 1 TAB ONCE DAILY 5 DAYS OF THE WEEK, TAKE 60 MG THE OTHER 2 DAYS OF THE WEEK.NEEDS APPOINTMENT 20 tablet 0     ibuprofen (ADVIL/MOTRIN) 600 MG tablet Take 1 tablet (600 mg) by mouth every 6 hours as needed for moderate pain (Patient not taking: Reported on 1/5/2018) 60 tablet 0     oxyCODONE (ROXICODONE) 5 MG IR tablet Take 1 tablet (5 mg) by mouth every 6 hours as needed for pain (Patient not taking: Reported on 1/5/2018) 10 tablet 0         Review of Systems   Constitutional: Negative for chills and fever.   HENT: Negative for congestion, ear pain, hearing loss and sore throat.    Eyes: Negative for pain and visual disturbance.   Respiratory: Negative for cough and shortness of breath.    Cardiovascular: Negative for chest pain and palpitations.   Gastrointestinal: Negative for abdominal pain, constipation, diarrhea, heartburn, hematochezia and nausea.   Breasts:  Negative for tenderness, breast mass and discharge.   Genitourinary: Negative for dysuria, frequency, genital sores, pelvic pain, urgency, vaginal bleeding and vaginal discharge.   Musculoskeletal: Negative for arthralgias and myalgias.   Skin: Negative for rash.   Neurological: Negative for dizziness, weakness, headaches and paresthesias.   Psychiatric/Behavioral: Negative for mood changes. The patient is not nervous/anxious.        Saw Dr. Hale for pap.    OBJECTIVE:   /70   Pulse 53   Temp 97.8  F (36.6  C) (Tympanic)   Resp 12   Ht 1.556 m (5' 1.25\")   Wt 57.4 kg (126 lb 8 oz)   LMP 09/01/2008   SpO2 98%   BMI 23.71 kg/m    Physical Exam  GENERAL APPEARANCE: alert and no distress  NECK: " "no adenopathy and thyroid normal to palpation  RESP: lungs clear to auscultation - no rales, rhonchi or wheezes  CV: regular rates and rhythm  MS: no edema.   PSYCH: mentation appears normal and affect normal/bright    TSH   Date Value Ref Range Status   01/05/2018 0.48 0.40 - 4.00 mU/L Final         ASSESSMENT/PLAN:     1. Hypothyroidism, unspecified type  Clinically euthyroid. Testing at this time.   - TSH with free T4 reflex    2. Screening for diabetes mellitus  She needs this for biometric screening at 's work.  - Glucose    3. CARDIOVASCULAR SCREENING; LDL GOAL LESS THAN 160  As above.   - Lipid panel reflex to direct LDL Fasting    She did sign XAVIER for getting pap.    Defers breast and colon cancer screening.   Later noted she may do FIT test; she has one at home.           COUNSELING:  Reviewed preventive health counseling, as reflected in patient instructions       Regular exercise       Healthy diet/nutrition    BP Readings from Last 1 Encounters:   02/08/19 114/70     Estimated body mass index is 23.71 kg/m  as calculated from the following:    Height as of this encounter: 1.556 m (5' 1.25\").    Weight as of this encounter: 57.4 kg (126 lb 8 oz).           reports that  has never smoked. she has never used smokeless tobacco.      Counseling Resources:  ATP IV Guidelines  Pooled Cohorts Equation Calculator  Breast Cancer Risk Calculator  FRAX Risk Assessment  ICSI Preventive Guidelines  Dietary Guidelines for Americans, 2010  USDA's MyPlate  ASA Prophylaxis  Lung CA Screening    Deyanira Darling MD, MD  [facility] [place]  "

## 2019-02-08 NOTE — PROGRESS NOTES
"  Injectable Influenza Immunization Documentation    1.  Is the person to be vaccinated sick today?  {YES/NO DEFAULT NO:52892::\" No\"}    2. Does the person to be vaccinated have an allergy to a component   of the vaccine?  {YES/NO DEFAULT NO:90867::\" No\"}  Egg Allergy Algorithm Link    3. Has the person to be vaccinated ever had a serious reaction   to influenza vaccine in the past?  {YES/NO DEFAULT NO:28909::\" No\"}    4. Has the person to be vaccinated ever had Guillain-Barré syndrome?  {YES/NO DEFAULT NO:37792::\" No\"}    Form completed by ***         "

## 2019-02-11 ENCOUNTER — TELEPHONE (OUTPATIENT)
Dept: FAMILY MEDICINE | Facility: CLINIC | Age: 54
End: 2019-02-11

## 2019-02-11 RX ORDER — THYROID 90 MG/1
1 TABLET ORAL DAILY
Qty: 60 TABLET | Refills: 0 | Status: SHIPPED | OUTPATIENT
Start: 2019-02-11 | End: 2019-03-07

## 2019-02-12 NOTE — TELEPHONE ENCOUNTER
Please call her and let her know we went up on her dose of thyroid medication; trying the 90 mg tablet daily.  See result note.  She should recheck in 6 weeks.

## 2019-02-26 DIAGNOSIS — E03.9 HYPOTHYROIDISM, UNSPECIFIED TYPE: ICD-10-CM

## 2019-02-26 RX ORDER — THYROID 90 MG/1
TABLET ORAL
Qty: 20 TABLET | Refills: 0 | OUTPATIENT
Start: 2019-02-26

## 2019-02-26 NOTE — TELEPHONE ENCOUNTER
Patient dose was changed to daily dosing not alternating on 2/11/2019.  This is an old dose request.  Medication denied.  Yana PERRY RN - Triage  [facility]

## 2019-02-26 NOTE — TELEPHONE ENCOUNTER
"Requested Prescriptions   Pending Prescriptions Disp Refills     thyroid (FLAKO THYROID) 90 MG tablet [Pharmacy Med Name: FLAKO THYROID 90 MG TABLET]  Last Written Prescription Date:  2/11/19  Last Fill Quantity: 60,  # refills: 0   Last office visit: 2/8/2019 with prescribing provider:  Deyanira Darling MD    Future Office Visit:     20 tablet 0     Sig: TAKE 1 TAB ONCE DAILY 5 DAYS OF THE WEEK, TAKE 60 MG THE OTHER 2 DAYS OF THE WEEK.NEEDS APPOINTMENT    Thyroid Protocol Failed - 2/26/2019  1:29 AM       Failed - Normal TSH on file in past 12 months    Recent Labs   Lab Test 02/08/19  0948   TSH 13.69*             Passed - Patient is 12 years or older       Passed - Recent (12 mo) or future (30 days) visit within the authorizing provider's specialty    Patient had office visit in the last 12 months or has a visit in the next 30 days with authorizing provider or within the authorizing provider's specialty.  See \"Patient Info\" tab in inbasket, or \"Choose Columns\" in Meds & Orders section of the refill encounter.             Passed - Medication is active on med list       Passed - No active pregnancy on record    If patient is pregnant or has had a positive pregnancy test, please check TSH.         Passed - No positive pregnancy test in past 12 months    If patient is pregnant or has had a positive pregnancy test, please check TSH.            "

## 2019-03-05 DIAGNOSIS — E03.9 HYPOTHYROIDISM, UNSPECIFIED TYPE: ICD-10-CM

## 2019-03-05 NOTE — TELEPHONE ENCOUNTER
"Requested Prescriptions   Pending Prescriptions Disp Refills     thyroid (ARMOUR THYROID) 90 MG tablet  Last Written Prescription Date:  2/11/19  Last Fill Quantity: 60,  # refills: 0   Last office visit: 2/8/2019 with prescribing provider:  Deyanira Darling MD    Future Office Visit:     60 tablet 0     Sig: Take 1 tablet (90 mg) by mouth daily    Thyroid Protocol Failed - 3/5/2019  1:30 PM       Failed - Normal TSH on file in past 12 months    Recent Labs   Lab Test 02/08/19  0948   TSH 13.69*             Passed - Patient is 12 years or older       Passed - Recent (12 mo) or future (30 days) visit within the authorizing provider's specialty    Patient had office visit in the last 12 months or has a visit in the next 30 days with authorizing provider or within the authorizing provider's specialty.  See \"Patient Info\" tab in inbasket, or \"Choose Columns\" in Meds & Orders section of the refill encounter.             Passed - Medication is active on med list       Passed - No active pregnancy on record    If patient is pregnant or has had a positive pregnancy test, please check TSH.         Passed - No positive pregnancy test in past 12 months    If patient is pregnant or has had a positive pregnancy test, please check TSH.            "

## 2019-03-07 RX ORDER — THYROID 90 MG/1
1 TABLET ORAL DAILY
Qty: 30 TABLET | Refills: 0 | Status: SHIPPED | OUTPATIENT
Start: 2019-03-07 | End: 2019-04-06 | Stop reason: DRUGHIGH

## 2019-03-07 NOTE — TELEPHONE ENCOUNTER
Medication is being filled for a one time refill only as patient is due for follow up lab appointment with increase in medication dose.  Future orders placed.  30 day refill given.  Please call and assist with scheduling appointment prior to next refill   Yana PERRY RN - Triage  [facility]

## 2019-04-02 ENCOUNTER — TELEPHONE (OUTPATIENT)
Dept: FAMILY MEDICINE | Facility: CLINIC | Age: 54
End: 2019-04-02

## 2019-04-04 DIAGNOSIS — E03.9 HYPOTHYROIDISM, UNSPECIFIED TYPE: ICD-10-CM

## 2019-04-04 LAB
T4 FREE SERPL-MCNC: 0.71 NG/DL (ref 0.76–1.46)
TSH SERPL DL<=0.005 MIU/L-ACNC: 4.52 MU/L (ref 0.4–4)

## 2019-04-04 PROCEDURE — 36415 COLL VENOUS BLD VENIPUNCTURE: CPT | Performed by: FAMILY MEDICINE

## 2019-04-04 PROCEDURE — 84443 ASSAY THYROID STIM HORMONE: CPT | Performed by: FAMILY MEDICINE

## 2019-04-04 PROCEDURE — 84439 ASSAY OF FREE THYROXINE: CPT | Performed by: FAMILY MEDICINE

## 2019-04-06 ENCOUNTER — TELEPHONE (OUTPATIENT)
Dept: FAMILY MEDICINE | Facility: CLINIC | Age: 54
End: 2019-04-06

## 2019-04-06 DIAGNOSIS — E03.9 HYPOTHYROIDISM, UNSPECIFIED TYPE: ICD-10-CM

## 2019-04-06 RX ORDER — THYROID 120 MG/1
120 TABLET ORAL DAILY
Qty: 60 TABLET | Refills: 0 | Status: SHIPPED | OUTPATIENT
Start: 2019-04-06 | End: 2019-06-24

## 2019-04-06 NOTE — TELEPHONE ENCOUNTER
Please call her.     Her TSH was mildly elevated.   Did go up on her dose. We should recheck again in 6 weeks; see if you can help her schedule.  I did send her a result note.  Thanks!

## 2019-04-08 NOTE — TELEPHONE ENCOUNTER
Thyroid (armour), 120 mg tab 4/6/19 to [facility].    Called patient. Informed of lab results, med change andneed to recheck again in 6 weeks. Offered to help schedule lab-only appt.    Patient states she may not have been taking them as regularly as she should have. Also hasn't been eating as well or exercising as much. Wondering if she should just stay on current dose and recheck in 6 weeks, worried about swinging in the other direction. However, patient finally decided she will take the new dose for 6 weeks and monitor how she is feeling and retest in 6 weeks.    Yana HERNANDEZ, Triage RN

## 2019-06-24 DIAGNOSIS — E03.9 HYPOTHYROIDISM, UNSPECIFIED TYPE: ICD-10-CM

## 2019-06-24 NOTE — TELEPHONE ENCOUNTER
"Requested Prescriptions   Pending Prescriptions Disp Refills     FLAKO THYROID 120 MG tablet [Pharmacy Med Name: FLAKO THYROID 120 MG TABLET]  Last Written Prescription Date:  4/6/19  Last Fill Quantity: 60,  # refills: 0   Last office visit: 2/8/2019 with prescribing provider:  Deyanira Darling MD   Future Office Visit:     60 tablet 0     Sig: TAKE 1 TABLET (120 MG) BY MOUTH DAILY       Thyroid Protocol Failed - 6/24/2019 10:15 AM        Failed - Normal TSH on file in past 12 months     Recent Labs   Lab Test 04/04/19  0857   TSH 4.52*              Passed - Patient is 12 years or older        Passed - Recent (12 mo) or future (30 days) visit within the authorizing provider's specialty     Patient had office visit in the last 12 months or has a visit in the next 30 days with authorizing provider or within the authorizing provider's specialty.  See \"Patient Info\" tab in inbasket, or \"Choose Columns\" in Meds & Orders section of the refill encounter.              Passed - Medication is active on med list        Passed - No active pregnancy on record     If patient is pregnant or has had a positive pregnancy test, please check TSH.          Passed - No positive pregnancy test in past 12 months     If patient is pregnant or has had a positive pregnancy test, please check TSH.            "

## 2019-06-25 DIAGNOSIS — E03.9 HYPOTHYROIDISM, UNSPECIFIED TYPE: ICD-10-CM

## 2019-06-25 DIAGNOSIS — Z13.6 CARDIOVASCULAR SCREENING; LDL GOAL LESS THAN 160: ICD-10-CM

## 2019-06-25 LAB — TSH SERPL DL<=0.005 MIU/L-ACNC: 0.6 MU/L (ref 0.4–4)

## 2019-06-25 PROCEDURE — 84443 ASSAY THYROID STIM HORMONE: CPT | Performed by: FAMILY MEDICINE

## 2019-06-25 PROCEDURE — 36415 COLL VENOUS BLD VENIPUNCTURE: CPT | Performed by: FAMILY MEDICINE

## 2019-06-25 RX ORDER — THYROID, PORCINE 120 MG/1
TABLET ORAL
Qty: 90 TABLET | Refills: 3 | Status: SHIPPED | OUTPATIENT
Start: 2019-06-25 | End: 2020-06-26

## 2019-06-25 NOTE — TELEPHONE ENCOUNTER
I spoke w/ pt today and she is leaving for trip tomorrow and does not have enough thyroid medication. Lab is finalized not sure if you wanted to keep the same or change dosage. Told her I would send it to you to get refilled today so she could . Thanks.     Julia MORATAYA RN

## 2020-06-17 DIAGNOSIS — E03.9 HYPOTHYROIDISM, UNSPECIFIED TYPE: ICD-10-CM

## 2020-06-17 NOTE — TELEPHONE ENCOUNTER
LMTCB- Please help pt schedule appt. LOV 2/8/2019.   Refill still pending...    Julia MORATAYA RN

## 2020-06-19 NOTE — TELEPHONE ENCOUNTER
Routing refill request to provider for review/approval because:  Patient needs to be seen because it has been more than 1 year since last office visit.    Patient due for visit (can be virtual), please call and schedule appt and ask if elle 30 day refill is needed. Reroute back to refill pool with answer.    Yvette GRAHAM RN, BSN

## 2020-06-26 RX ORDER — THYROID, PORCINE 120 MG/1
TABLET ORAL
Qty: 30 TABLET | Refills: 0 | Status: SHIPPED | OUTPATIENT
Start: 2020-06-26 | End: 2020-07-27

## 2020-06-26 NOTE — TELEPHONE ENCOUNTER
Medication is being filled for 1 time refill only due to:  Patient needs labs TSH. Patient needs to be seen because it has been more than one year since last visit.   Next 5 appointments (look out 90 days)    Aug 11, 2020  1:30 PM CDT  [facility]YS[facility] [address] [facility] [address]  [phone]        Laine Hawley RN       Patient reports to ED with complaints of bilateral ear pain and decreased hearing x1week.

## 2020-06-26 NOTE — TELEPHONE ENCOUNTER
Patient scheduled fasting lab only on 8/6/2020, please place thyroid lab also and physical with PCP on 8/11/2020. Will need refill to get to appt.  -Emily Casanova

## 2020-07-26 DIAGNOSIS — E03.9 HYPOTHYROIDISM, UNSPECIFIED TYPE: ICD-10-CM

## 2020-07-27 RX ORDER — THYROID, PORCINE 120 MG/1
TABLET ORAL
Qty: 30 TABLET | Refills: 0 | Status: SHIPPED | OUTPATIENT
Start: 2020-07-27 | End: 2020-08-11

## 2020-07-27 NOTE — TELEPHONE ENCOUNTER
Routing refill request to provider for review/approval because:  Labs not current:  TSH    Julianne Benavidez RN Flex

## 2020-08-06 ENCOUNTER — TELEPHONE (OUTPATIENT)
Dept: FAMILY MEDICINE | Facility: CLINIC | Age: 55
End: 2020-08-06

## 2020-08-06 DIAGNOSIS — E03.9 HYPOTHYROIDISM, UNSPECIFIED TYPE: ICD-10-CM

## 2020-08-06 DIAGNOSIS — Z13.6 CARDIOVASCULAR SCREENING; LDL GOAL LESS THAN 160: ICD-10-CM

## 2020-08-06 DIAGNOSIS — Z13.6 CARDIOVASCULAR SCREENING; LDL GOAL LESS THAN 160: Primary | ICD-10-CM

## 2020-08-06 LAB
ANION GAP SERPL CALCULATED.3IONS-SCNC: 6 MMOL/L (ref 3–14)
BUN SERPL-MCNC: 20 MG/DL (ref 7–30)
CALCIUM SERPL-MCNC: 8.9 MG/DL (ref 8.5–10.1)
CHLORIDE SERPL-SCNC: 106 MMOL/L (ref 94–109)
CHOLEST SERPL-MCNC: 237 MG/DL
CO2 SERPL-SCNC: 25 MMOL/L (ref 20–32)
CREAT SERPL-MCNC: 0.59 MG/DL (ref 0.52–1.04)
GFR SERPL CREATININE-BSD FRML MDRD: >90 ML/MIN/{1.73_M2}
GLUCOSE SERPL-MCNC: 91 MG/DL (ref 70–99)
HDLC SERPL-MCNC: 67 MG/DL
LDLC SERPL CALC-MCNC: 149 MG/DL
NONHDLC SERPL-MCNC: 170 MG/DL
POTASSIUM SERPL-SCNC: 4.1 MMOL/L (ref 3.4–5.3)
SODIUM SERPL-SCNC: 137 MMOL/L (ref 133–144)
T4 FREE SERPL-MCNC: 0.86 NG/DL (ref 0.76–1.46)
TRIGL SERPL-MCNC: 104 MG/DL
TSH SERPL DL<=0.005 MIU/L-ACNC: 0.07 MU/L (ref 0.4–4)

## 2020-08-06 PROCEDURE — 36415 COLL VENOUS BLD VENIPUNCTURE: CPT | Performed by: FAMILY MEDICINE

## 2020-08-06 PROCEDURE — 80048 BASIC METABOLIC PNL TOTAL CA: CPT | Performed by: FAMILY MEDICINE

## 2020-08-06 PROCEDURE — 84443 ASSAY THYROID STIM HORMONE: CPT | Performed by: FAMILY MEDICINE

## 2020-08-06 PROCEDURE — 80061 LIPID PANEL: CPT | Performed by: FAMILY MEDICINE

## 2020-08-06 PROCEDURE — 84439 ASSAY OF FREE THYROXINE: CPT | Performed by: FAMILY MEDICINE

## 2020-08-06 NOTE — TELEPHONE ENCOUNTER
[facility] called and stated they need a new order placed for BMP and lipids previous order has .   Orders placed.     Aaliyah Silvestre RN on 2020 at 9:06 AM

## 2020-08-10 NOTE — PROGRESS NOTES
SUBJECTIVE:   CC: Chani Marie is an 54 year old woman who presents for preventive health visit.     Healthy Habits:     Getting at least 3 servings of Calcium per day:  Yes    Bi-annual eye exam:  NO    Dental care twice a year:  Yes    Sleep apnea or symptoms of sleep apnea:  None    Diet:  Regular (no restrictions)    Frequency of exercise:  4-5 days/week    Duration of exercise:  45-60 minutes    Taking medications regularly:  Yes    Medication side effects:  None    PHQ-2 Total Score: 0    Additional concerns today:  No        Today's PHQ-2 Score:   PHQ-2 ( 1999 Pfizer) 2/8/2019   Q1: Little interest or pleasure in doing things 0   Q2: Feeling down, depressed or hopeless 0   PHQ-2 Score 0   Q1: Little interest or pleasure in doing things Not at all   Q2: Feeling down, depressed or hopeless Not at all   PHQ-2 Score 0       Abuse: Current or Past(Physical, Sexual or Emotional)- No  Do you feel safe in your environment? Yes        Social History     Tobacco Use     Smoking status: Never Smoker     Smokeless tobacco: Never Used   Substance Use Topics     Alcohol use: Yes     Alcohol/week: 0.0 - 0.8 standard drinks         Alcohol Use 2/8/2019   Prescreen: >3 drinks/day or >7 drinks/week? No       Reviewed orders with patient.  Reviewed health maintenance and updated orders accordingly - Yes          Pertinent mammograms are reviewed under the imaging tab.  History of abnormal Pap smear: NO - age 30-65 PAP every 5 years with negative HPV co-testing recommended  PAP / HPV Latest Ref Rng & Units 6/1/2016 6/1/2005   PAP Negative Negative NIL     Reviewed and updated as needed this visit by clinical staff         Reviewed and updated as needed this visit by Provider        Patient Active Problem List   Diagnosis     Hypothyroidism     CARDIOVASCULAR SCREENING; LDL GOAL LESS THAN 160       Past Medical History:   Diagnosis Date     Unspecified hypothyroidism        Past Surgical History:   Procedure Laterality Date      C NONSPECIFIC PROCEDURE      Lasik       OB History   No obstetric history on file.       Current Outpatient Medications   Medication Sig Dispense Refill     FLAKO THYROID 120 MG tablet TAKE 1 TABLET (120 MG) BY MOUTH DAILY 30 tablet 0       Family History   Problem Relation Age of Onset     Lipids Mother      Cancer Father         lung     Developmental delay Daughter         vaccine injured     Seizure Disorder Daughter        Social History     Tobacco Use     Smoking status: Never Smoker     Smokeless tobacco: Never Used   Substance Use Topics     Alcohol use: Yes     Alcohol/week: 0.0 - 0.8 standard drinks       Immunization History   Administered Date(s) Administered     DT (PEDS <7y) 11/14/1990     MMR 11/14/1990     OPV, trivalent, live 01/06/1993     Rhogam 07/07/1998     TD (ADULT, 7+) 04/17/2002, 06/04/2011     Td (Adult), Adsorbed 10/12/1999         Review of Systems   Constitutional: Negative for chills and fever.   HENT: Negative for congestion, ear pain, hearing loss and sore throat.    Eyes: Negative for pain and visual disturbance.   Respiratory: Negative for cough and shortness of breath.    Cardiovascular: Negative for chest pain, palpitations and peripheral edema.   Gastrointestinal: Negative for abdominal pain, constipation, diarrhea, heartburn, hematochezia and nausea.   Breasts:  Negative for tenderness, breast mass and discharge.   Genitourinary: Negative for dysuria, frequency, genital sores, hematuria, pelvic pain, urgency, vaginal bleeding and vaginal discharge.   Musculoskeletal: Negative for arthralgias, joint swelling and myalgias.   Skin: Negative for rash.   Neurological: Negative for dizziness, weakness, headaches and paresthesias.   Psychiatric/Behavioral: Negative for mood changes. The patient is not nervous/anxious.      Disabled daughter. With some part time jobs.    Can feel the lack of thyroid pill if misses it.      OBJECTIVE:   /64   Pulse 61   Temp 98.2  F (36.8  " C) (Tympanic)   Resp 16   Ht 1.543 m (5' 0.75\")   Wt 56.9 kg (125 lb 6.4 oz)   LMP 09/01/2008   SpO2 97%   BMI 23.89 kg/m    Physical Exam  GENERAL APPEARANCE: healthy, alert and no distress  EYES: Eyes grossly normal to inspection, PERRL and conjunctivae and sclerae normal  HENT: ear canals and TM's normal, nose and mouth without ulcers or lesions, oropharynx clear and oral mucous membranes moist  NECK: no adenopathy, no asymmetry, masses, or scars and thyroid normal to palpation  RESP: lungs clear to auscultation - no rales, rhonchi or wheezes  BREAST: normal without masses, tenderness or nipple discharge and no palpable axillary masses or adenopathy  CV: regular rates and rhythm and no peripheral edema  ABDOMEN: soft, nontender, no hepatosplenomegaly, no masses and bowel sounds normal  MS: no musculoskeletal defects are noted and gait is age appropriate without ataxia  SKIN: no suspicious lesions or rashes  NEURO: Normal strength and tone, sensory exam grossly normal, mentation intact and speech normal  PSYCH: mentation appears normal and affect normal/bright    TSH   Date Value Ref Range Status   08/06/2020 0.07 (L) 0.40 - 4.00 mU/L Final     Component      Latest Ref Rng & Units 8/6/2020   Sodium      133 - 144 mmol/L 137   Potassium      3.4 - 5.3 mmol/L 4.1   Chloride      94 - 109 mmol/L 106   Carbon Dioxide      20 - 32 mmol/L 25   Anion Gap      3 - 14 mmol/L 6   Glucose      70 - 99 mg/dL 91   Urea Nitrogen      7 - 30 mg/dL 20   Creatinine      0.52 - 1.04 mg/dL 0.59   GFR Estimate      >60 mL/min/1.73:m2 >90   GFR Estimate If Black      >60 mL/min/1.73:m2 >90   Calcium      8.5 - 10.1 mg/dL 8.9   Cholesterol      <200 mg/dL 237 (H)   Triglycerides      <150 mg/dL 104   HDL Cholesterol      >49 mg/dL 67   LDL Cholesterol Calculated      <100 mg/dL 149 (H)   Non HDL Cholesterol      <130 mg/dL 170 (H)   TSH      0.40 - 4.00 mU/L 0.07 (L)   T4 Free      0.76 - 1.46 ng/dL 0.86       The 10-year ASCVD " "risk score (Marlen NICOLAS Jr., et al., 2013) is: 1.2%    Values used to calculate the score:      Age: 54 years      Sex: Female      Is Non- : No      Diabetic: No      Tobacco smoker: No      Systolic Blood Pressure: 115 mmHg      Is BP treated: No      HDL Cholesterol: 67 mg/dL      Total Cholesterol: 237 mg/dL          ASSESSMENT/PLAN:   1. Routine general medical examination at a health care facility  Encourage flu shot this Fall.    2. Hypothyroidism, unspecified type  Clinically euthyroid; x TSH a little low. She was not real anxious to decrease dose. Did discuss potential long term effects of elevated thyroid. Will decrease slightly by alternating current dose with slightly lower dose. She was in agreement with this.   - thyroid (ARMOUR) 90 MG tablet; Take 1 tablet (90 mg) by mouth every other day  Dispense: 30 tablet; Refill: 0  - thyroid (ARMOUR THYROID) 120 MG tablet; Take 1 tablet (120 mg) by mouth every other day  Dispense: 30 tablet; Refill: 0  - TSH with free T4 reflex; Future    3. Special screening for malignant neoplasms, colon    - Fecal colorectal cancer screen (FIT); Future    COUNSELING:  Reviewed preventive health counseling, as reflected in patient instructions       Regular exercise       Healthy diet/nutrition       Vision screening    Estimated body mass index is 23.89 kg/m  as calculated from the following:    Height as of this encounter: 1.543 m (5' 0.75\").    Weight as of this encounter: 56.9 kg (125 lb 6.4 oz).         reports that she has never smoked. She has never used smokeless tobacco.      Counseling Resources:  ATP IV Guidelines  Pooled Cohorts Equation Calculator  Breast Cancer Risk Calculator  FRAX Risk Assessment  ICSI Preventive Guidelines  Dietary Guidelines for Americans, 2010  [place]'s MyPlate  ASA Prophylaxis  Lung CA Screening    Deyanira Darling MD, MD  [facility]  "

## 2020-08-11 ENCOUNTER — OFFICE VISIT (OUTPATIENT)
Dept: FAMILY MEDICINE | Facility: CLINIC | Age: 55
End: 2020-08-11
Payer: COMMERCIAL

## 2020-08-11 VITALS
OXYGEN SATURATION: 97 % | RESPIRATION RATE: 16 BRPM | HEART RATE: 61 BPM | BODY MASS INDEX: 23.68 KG/M2 | TEMPERATURE: 98.2 F | DIASTOLIC BLOOD PRESSURE: 64 MMHG | SYSTOLIC BLOOD PRESSURE: 115 MMHG | HEIGHT: 61 IN | WEIGHT: 125.4 LBS

## 2020-08-11 DIAGNOSIS — E03.9 HYPOTHYROIDISM, UNSPECIFIED TYPE: ICD-10-CM

## 2020-08-11 DIAGNOSIS — Z12.11 SPECIAL SCREENING FOR MALIGNANT NEOPLASMS, COLON: ICD-10-CM

## 2020-08-11 DIAGNOSIS — Z00.00 ROUTINE GENERAL MEDICAL EXAMINATION AT A HEALTH CARE FACILITY: Primary | ICD-10-CM

## 2020-08-11 PROCEDURE — 99396 PREV VISIT EST AGE 40-64: CPT | Performed by: FAMILY MEDICINE

## 2020-08-11 PROCEDURE — 99213 OFFICE O/P EST LOW 20 MIN: CPT | Mod: 25 | Performed by: FAMILY MEDICINE

## 2020-08-11 RX ORDER — THYROID 90 MG/1
1 TABLET ORAL EVERY OTHER DAY
Qty: 30 TABLET | Refills: 0 | Status: SHIPPED | OUTPATIENT
Start: 2020-08-11 | End: 2020-10-08

## 2020-08-11 RX ORDER — THYROID 120 MG/1
120 TABLET ORAL EVERY OTHER DAY
Qty: 30 TABLET | Refills: 0 | Status: SHIPPED | OUTPATIENT
Start: 2020-08-11 | End: 2020-10-18

## 2020-08-11 ASSESSMENT — ENCOUNTER SYMPTOMS
HEMATURIA: 0
PALPITATIONS: 0
ABDOMINAL PAIN: 0
WEAKNESS: 0
HEADACHES: 0
BREAST MASS: 0
DYSURIA: 0
CONSTIPATION: 0
SORE THROAT: 0
NAUSEA: 0
HEMATOCHEZIA: 0
PARESTHESIAS: 0
DIZZINESS: 0
CHILLS: 0
EYE PAIN: 0
MYALGIAS: 0
SHORTNESS OF BREATH: 0
JOINT SWELLING: 0
NERVOUS/ANXIOUS: 0
FREQUENCY: 0
COUGH: 0
HEARTBURN: 0
ARTHRALGIAS: 0
DIARRHEA: 0
FEVER: 0

## 2020-08-11 ASSESSMENT — MIFFLIN-ST. JEOR: SCORE: 1112.22

## 2020-09-11 DIAGNOSIS — E03.9 HYPOTHYROIDISM, UNSPECIFIED TYPE: ICD-10-CM

## 2020-09-11 RX ORDER — THYROID, PORCINE 120 MG/1
TABLET ORAL
Qty: 30 TABLET | Refills: 0
Start: 2020-09-11

## 2020-09-11 NOTE — TELEPHONE ENCOUNTER
She should be alternating between the two doses and should have enough until early October.   She was to recheck in 6 weeks from 8/11 when we decreased her dosing in this way.    See what she is doing...

## 2020-09-11 NOTE — TELEPHONE ENCOUNTER
FYI    Patient says is alternating, but out of medication.    Spoke with pharmacy. Insurance will only fill 1 month at a time. So filled 15 tabs of the 120 mg and 90 mg.    Pharmacy will fill remainder of both.    Patient informed and reminded of follow up lab.    Laine Hawley RN

## 2020-09-11 NOTE — TELEPHONE ENCOUNTER
Routing refill request to provider for review/approval because:  Labs out of range:    TSH   Date Value Ref Range Status   08/06/2020 0.07 (L) 0.40 - 4.00 mU/L Final       Laine Hawley RN

## 2020-10-05 DIAGNOSIS — E03.9 HYPOTHYROIDISM, UNSPECIFIED TYPE: ICD-10-CM

## 2020-10-05 NOTE — TELEPHONE ENCOUNTER
LMTCB- Patient is due for labs. Please continue to assist with scheduling.     Aaliyah Silvestre RN on 10/5/2020 at 3:49 PM

## 2020-10-08 RX ORDER — THYROID,PORK 90 MG
TABLET ORAL
Qty: 15 TABLET | Refills: 0 | Status: SHIPPED | OUTPATIENT
Start: 2020-10-08 | End: 2020-10-18

## 2020-10-08 NOTE — TELEPHONE ENCOUNTER
Medication is being filled for 1 time refill only due to:  Patient needs labs TSH.     Lab scheduled 10/15/2020.    Laine Hawley RN

## 2020-10-17 DIAGNOSIS — E03.9 HYPOTHYROIDISM, UNSPECIFIED TYPE: ICD-10-CM

## 2020-10-17 LAB
T4 FREE SERPL-MCNC: 0.69 NG/DL (ref 0.76–1.46)
TSH SERPL DL<=0.005 MIU/L-ACNC: 0.27 MU/L (ref 0.4–4)

## 2020-10-17 PROCEDURE — 84439 ASSAY OF FREE THYROXINE: CPT | Performed by: FAMILY MEDICINE

## 2020-10-17 PROCEDURE — 36415 COLL VENOUS BLD VENIPUNCTURE: CPT | Performed by: FAMILY MEDICINE

## 2020-10-17 PROCEDURE — 84443 ASSAY THYROID STIM HORMONE: CPT | Performed by: FAMILY MEDICINE

## 2020-10-18 ENCOUNTER — TELEPHONE (OUTPATIENT)
Dept: FAMILY MEDICINE | Facility: CLINIC | Age: 55
End: 2020-10-18

## 2020-10-18 DIAGNOSIS — E03.9 HYPOTHYROIDISM, UNSPECIFIED TYPE: ICD-10-CM

## 2020-10-18 NOTE — TELEPHONE ENCOUNTER
Please contact her.    I am recommending she see Endocrinology.  I am thinking we can continue her current dose until she does get in to see them; I doubt she would want to decrease...     If ok with this, give her the number(s) and send in the prescription.    From my result note:    TSH stands for Thyroid Stimulating Hormone. It is the most sensitive thyroid test that we have. It goes in the opposite direction of the thyroid hormone level; if your thyroid is not producing sufficient thyroid hormone, it goes up to tell your thyroid to make more.    Usually, we will think of decreasing the dose of thyroid with this. However, your T4 is slightly lower as well.   You are on a mixture of T3 and T4. I am used to using levothyroxine (which is T4)... I am wondering if we should have you visit with Endocrinology around this as I am not quite sure how to adjust.

## 2020-10-21 RX ORDER — THYROID 120 MG/1
120 TABLET ORAL EVERY OTHER DAY
Qty: 45 TABLET | Refills: 0 | Status: SHIPPED | OUTPATIENT
Start: 2020-10-21

## 2020-10-21 RX ORDER — THYROID,PORK 90 MG
TABLET ORAL
Qty: 45 TABLET | Refills: 0 | Status: SHIPPED | OUTPATIENT
Start: 2020-10-21

## 2020-10-21 NOTE — TELEPHONE ENCOUNTER
Informed patient of referral. Patient request to send copy per mychart.     Patient informed of prescriptions sent to pharmacy.    Laine Hawley RN

## 2021-01-15 ENCOUNTER — HEALTH MAINTENANCE LETTER (OUTPATIENT)
Age: 56
End: 2021-01-15

## 2021-05-24 ENCOUNTER — TELEPHONE (OUTPATIENT)
Dept: FAMILY MEDICINE | Facility: CLINIC | Age: 56
End: 2021-05-24

## 2021-05-24 NOTE — LETTER
[facility]  [address] [address] [phone]       July 19, 2021    Chani Marie  [address] [address]    Dear Chani,    We care about your health and have reviewed your health plan and are making recommendations based on this review, to optimize your health.    You are in particular need of attention regarding:  -Breast Cancer Screening  -Colon Cancer Screening  -Wellness (Physical) Visit     We are recommending that you:                                              -schedule a WELLNESS (Physical) APPOINTMENT. We will check fasting labs the same day. You should have nothing to eat except water and meds for 8-10 hours prior.                                                                                                                                        -schedule a MAMMOGRAM which is due.         1 in 8 women will develop invasive breast cancer during her lifetime and it is the most common non-skin cancer in American women.  EARLY detection, new treatments, and a better understanding of the disease have increased survival rates - the 5 year survival rate in the 1960s was 63% and today it is close to 90% .      If you are under/uninsured, we recommend you contact the [facility]. They offer mammograms at no charge or on a sliding fee charge. You can schedule with them at [phone]. Please have them send us the results.          Please disregard this reminder if you have had this exam elsewhere within the last year.  It would be helpful for us to have a copy of your mammogram report in our file so that we can best coordinate your care - please contact us with when your test was done so we can update your record.                                                                                                                      -schedule a COLONOSCOPY to look for colon cancer (due every 10 years or 5 years in higher risk  situations.)        Colon cancer is now the second leading cause of cancer-related deaths in the [place] for both men and women and there are over 130,000 new cases and 50,000 deaths per year from colon cancer.  Colonoscopies can prevent 90-95% of these deaths.  Problem lesions can be removed before they ever become cancer.  This test is not only looking for cancer, but also getting rid of precancerious lesions.    If you are under/uninsured, we recommend you contact the [employer] program. [employer] is a free colorectal cancer screening program that provides colonoscopies for eligible under/uninsured [place] men and women. If you are interested in receiving a free colonoscopy, please call [employer] at [phone] (mention code [employer]) to see if you re eligible.     If you do not wish to do a colonoscopy or cannot afford to do one, at this time, there is another option. It is called a FIT test or Fecal Immunochemical Occult Blood Test (take home stool sample kit).  It does not replace the colonoscopy for colorectal cancer screening, but it can detect hidden bleeding in the lower colon.  It does need to be repeated every year and if a positive result is obtained, you would be referred for a colonoscopy.       If you have completed either one of these tests at another facility, please call with the details of when and where the tests were done and if they were normal or not. Or have the records sent to our clinic so that we can best coordinate your care.    In addition, here is a list of due or overdue Health Maintenance reminders.    Health Maintenance Due   Topic Date Due     ANNUAL REVIEW OF HM ORDERS  Never done     Discuss Advance Care Planning  Never done     Colorectal Cancer Screening  Never done     COVID-19 Vaccine (1) Never done     HIV Screening  Never done     Hepatitis C Screening  Never done     Mammogram  06/16/2010     Zoster (Shingles) Vaccine (1 of 2) Never done     PHQ-2   01/01/2021     Diptheria Tetanus Pertussis (DTAP/TDAP/TD) Vaccine (3 - Td or Tdap) 06/04/2021     Preventive Care Visit  08/11/2021     HPV Screening  10/08/2021     PAP Smear  10/08/2021       To address the above recommendations, we encourage you to contact us at [phone], via Ever[employer] or by contacting Central Scheduling toll free at [phone] 24 hours a day. They will assist you with finding the most convenient time and location.    Thank you for trusting [facility] and we appreciate the opportunity to serve you.  We look forward to supporting your healthcare needs in the future.    Healthy Regards,    Your [facility] Team

## 2021-07-19 NOTE — TELEPHONE ENCOUNTER
Patient Quality Outreach 2nd Attempt      Summary:    Type of outreach:    Sent letter.    Next Steps:  Reach out within 90 days via Phone.    Max number of attempts reached: Yes. Will try again in 90 days if patient still on fail list.    Questions for provider review:    None                                                                                                                    Karley Greene CMA       Chart routed to Care Team.

## 2021-08-05 ENCOUNTER — LAB (OUTPATIENT)
Dept: LAB | Facility: CLINIC | Age: 56
End: 2021-08-05
Payer: COMMERCIAL

## 2021-08-05 DIAGNOSIS — R63.5 ABNORMAL WEIGHT GAIN: Primary | ICD-10-CM

## 2021-08-05 DIAGNOSIS — E03.9 MYXEDEMA HEART DISEASE: ICD-10-CM

## 2021-08-05 DIAGNOSIS — I51.9 MYXEDEMA HEART DISEASE: ICD-10-CM

## 2021-08-05 DIAGNOSIS — R53.83 FATIGUE: ICD-10-CM

## 2021-08-05 LAB
T3FREE SERPL-MCNC: 3.1 PG/ML (ref 2.3–4.2)
T4 FREE SERPL-MCNC: 0.66 NG/DL (ref 0.76–1.46)
TSH SERPL DL<=0.005 MIU/L-ACNC: 0.22 MU/L (ref 0.4–4)

## 2021-08-05 PROCEDURE — 86376 MICROSOMAL ANTIBODY EACH: CPT

## 2021-08-05 PROCEDURE — 84482 T3 REVERSE: CPT

## 2021-08-05 PROCEDURE — 84443 ASSAY THYROID STIM HORMONE: CPT

## 2021-08-05 PROCEDURE — 84439 ASSAY OF FREE THYROXINE: CPT

## 2021-08-05 PROCEDURE — 86800 THYROGLOBULIN ANTIBODY: CPT

## 2021-08-05 PROCEDURE — 36415 COLL VENOUS BLD VENIPUNCTURE: CPT

## 2021-08-05 PROCEDURE — 84481 FREE ASSAY (FT-3): CPT

## 2021-08-06 LAB
THYROGLOB AB SERPL IA-ACNC: <20 IU/ML
THYROPEROXIDASE AB SERPL-ACNC: 111 IU/ML

## 2021-08-08 LAB — T3REVERSE SERPL-MCNC: 10.9 NG/DL

## 2021-10-24 ENCOUNTER — HEALTH MAINTENANCE LETTER (OUTPATIENT)
Age: 56
End: 2021-10-24

## 2022-02-13 ENCOUNTER — HEALTH MAINTENANCE LETTER (OUTPATIENT)
Age: 57
End: 2022-02-13

## 2022-03-24 ENCOUNTER — OFFICE VISIT (OUTPATIENT)
Dept: URGENT CARE | Facility: URGENT CARE | Age: 57
End: 2022-03-24
Payer: COMMERCIAL

## 2022-03-24 VITALS
SYSTOLIC BLOOD PRESSURE: 122 MMHG | HEART RATE: 58 BPM | WEIGHT: 124 LBS | DIASTOLIC BLOOD PRESSURE: 78 MMHG | BODY MASS INDEX: 23.62 KG/M2 | TEMPERATURE: 98.4 F | OXYGEN SATURATION: 97 %

## 2022-03-24 DIAGNOSIS — J06.9 VIRAL URI WITH COUGH: Primary | ICD-10-CM

## 2022-03-24 PROCEDURE — 99213 OFFICE O/P EST LOW 20 MIN: CPT | Performed by: NURSE PRACTITIONER

## 2022-03-24 NOTE — PROGRESS NOTES
Assessment & Plan     Viral URI with cough  Declined covid swab and CBC/CXR    Discussed normal physical exam and VSS.    Push fluids  Lots of handwashing.    Rest as able.   Ibuprofen or dayquil/nyquil PRN  F/u in the clinic if symptoms persist or worsen.       Return in about 2 days (around 3/26/2022) for with regular provider if symptoms persist.    Emily Crooks, JADA MONK  [facility] Wilde is a 56 year old female who presents to clinic today for the following health issues:  Chief Complaint   Patient presents with     Cough     home covid test neg a couple days ago     Headache     HPI    URI Adult    Onset of symptoms was 1 week(s) ago.  Course of illness is same.    Severity mild  Current and Associated symptoms: cough - non-productive  Treatment measures tried include OTC Cough med, Fluids and Rest.  Predisposing factors include None.    Overall feels well, cough just persists.   Just came into make sure she didn't have pneumonia.         Review of Systems  Constitutional, HEENT, cardiovascular, pulmonary, GI, , musculoskeletal, neuro, skin, endocrine and psych systems are negative, except as otherwise noted.      Objective    /78 (BP Location: Right arm, Patient Position: Sitting, Cuff Size: Adult Regular)   Pulse 58   Temp 98.4  F (36.9  C) (Oral)   Wt 56.2 kg (124 lb)   LMP 09/01/2008   SpO2 97%   BMI 23.62 kg/m    Physical Exam   GENERAL: healthy, alert and no distress  EYES: Eyes grossly normal to inspection, PERRL and conjunctivae and sclerae normal  HENT: ear canals and TM's normal, nose and mouth without ulcers or lesions  NECK: no adenopathy, no asymmetry, masses, or scars and thyroid normal to palpation  RESP:  Soft dry cough.   lungs clear to auscultation - no rales, rhonchi or wheezes  CV: regular rate and rhythm, normal S1 S2, no S3 or S4, no murmur, click or rub, no peripheral edema and peripheral pulses strong  ABDOMEN: soft,  nontender, no hepatosplenomegaly, no masses and bowel sounds normal  MS: no gross musculoskeletal defects noted, no edema  SKIN: no suspicious lesions or rashes

## 2022-10-16 ENCOUNTER — HEALTH MAINTENANCE LETTER (OUTPATIENT)
Age: 57
End: 2022-10-16

## 2022-12-03 ENCOUNTER — HEALTH MAINTENANCE LETTER (OUTPATIENT)
Age: 57
End: 2022-12-03

## 2023-03-26 ENCOUNTER — HEALTH MAINTENANCE LETTER (OUTPATIENT)
Age: 58
End: 2023-03-26

## 2024-01-13 ENCOUNTER — HEALTH MAINTENANCE LETTER (OUTPATIENT)
Age: 59
End: 2024-01-13

## 2024-06-01 ENCOUNTER — HEALTH MAINTENANCE LETTER (OUTPATIENT)
Age: 59
End: 2024-06-01

## 2025-01-25 ENCOUNTER — HEALTH MAINTENANCE LETTER (OUTPATIENT)
Age: 60
End: 2025-01-25

## 2025-06-08 NOTE — PROGRESS NOTES
Recd records from [facility] 02/08/19 and forwarded to Deyanira Darling for review and scanning  
Admission